# Patient Record
Sex: MALE | Race: WHITE | NOT HISPANIC OR LATINO | Employment: OTHER | URBAN - METROPOLITAN AREA
[De-identification: names, ages, dates, MRNs, and addresses within clinical notes are randomized per-mention and may not be internally consistent; named-entity substitution may affect disease eponyms.]

---

## 2021-08-18 ENCOUNTER — APPOINTMENT (OUTPATIENT)
Dept: BEHAVIORAL HEALTH | Facility: CLINIC | Age: 31
End: 2021-08-18
Payer: COMMERCIAL

## 2021-08-20 ENCOUNTER — OFFICE VISIT (OUTPATIENT)
Dept: BEHAVIORAL HEALTH | Facility: CLINIC | Age: 31
End: 2021-08-20
Payer: COMMERCIAL

## 2021-08-20 DIAGNOSIS — F41.1 GENERALIZED ANXIETY DISORDER: ICD-10-CM

## 2021-08-20 DIAGNOSIS — F90.2 ADHD (ATTENTION DEFICIT HYPERACTIVITY DISORDER), COMBINED TYPE: ICD-10-CM

## 2021-08-20 PROCEDURE — 99204 OFFICE O/P NEW MOD 45 MIN: CPT | Mod: GC | Performed by: STUDENT IN AN ORGANIZED HEALTH CARE EDUCATION/TRAINING PROGRAM

## 2021-08-20 PROCEDURE — 90792 PSYCH DIAG EVAL W/MED SRVCS: CPT | Performed by: PSYCHIATRY & NEUROLOGY

## 2021-08-20 RX ORDER — DEXTROAMPHETAMINE SACCHARATE, AMPHETAMINE ASPARTATE, DEXTROAMPHETAMINE SULFATE AND AMPHETAMINE SULFATE 7.5; 7.5; 7.5; 7.5 MG/1; MG/1; MG/1; MG/1
30 TABLET ORAL 2 TIMES DAILY
Qty: 60 TABLET | Refills: 0 | Status: SHIPPED | OUTPATIENT
Start: 2021-09-19 | End: 2021-10-19

## 2021-08-20 RX ORDER — CLONAZEPAM 1 MG/1
1 TABLET ORAL
Qty: 30 TABLET | Refills: 2 | Status: SHIPPED | OUTPATIENT
Start: 2021-08-20 | End: 2021-09-19

## 2021-08-20 RX ORDER — DEXTROAMPHETAMINE SACCHARATE, AMPHETAMINE ASPARTATE, DEXTROAMPHETAMINE SULFATE AND AMPHETAMINE SULFATE 7.5; 7.5; 7.5; 7.5 MG/1; MG/1; MG/1; MG/1
30 TABLET ORAL 2 TIMES DAILY
Qty: 60 TABLET | Refills: 0 | Status: SHIPPED | OUTPATIENT
Start: 2021-10-19 | End: 2021-11-16 | Stop reason: SDUPTHER

## 2021-08-20 RX ORDER — DEXTROAMPHETAMINE SACCHARATE, AMPHETAMINE ASPARTATE, DEXTROAMPHETAMINE SULFATE AND AMPHETAMINE SULFATE 7.5; 7.5; 7.5; 7.5 MG/1; MG/1; MG/1; MG/1
30 TABLET ORAL 2 TIMES DAILY
Qty: 60 TABLET | Refills: 0 | Status: SHIPPED | OUTPATIENT
Start: 2021-08-20 | End: 2021-09-19

## 2021-08-20 ASSESSMENT — ANXIETY QUESTIONNAIRES
6. BECOMING EASILY ANNOYED OR IRRITABLE: SEVERAL DAYS
3. WORRYING TOO MUCH ABOUT DIFFERENT THINGS: SEVERAL DAYS
IF YOU CHECKED OFF ANY PROBLEMS ON THIS QUESTIONNAIRE, HOW DIFFICULT HAVE THESE PROBLEMS MADE IT FOR YOU TO DO YOUR WORK, TAKE CARE OF THINGS AT HOME, OR GET ALONG WITH OTHER PEOPLE: SOMEWHAT DIFFICULT
4. TROUBLE RELAXING: SEVERAL DAYS
5. BEING SO RESTLESS THAT IT IS HARD TO SIT STILL: NOT AT ALL
2. NOT BEING ABLE TO STOP OR CONTROL WORRYING: NOT AT ALL
1. FEELING NERVOUS, ANXIOUS, OR ON EDGE: MORE THAN HALF THE DAYS
7. FEELING AFRAID AS IF SOMETHING AWFUL MIGHT HAPPEN: MORE THAN HALF THE DAYS
GAD7 TOTAL SCORE: 7

## 2021-08-20 ASSESSMENT — PATIENT HEALTH QUESTIONNAIRE - PHQ9
SUM OF ALL RESPONSES TO PHQ QUESTIONS 1-9: 5
CLINICAL INTERPRETATION OF PHQ2 SCORE: 1
5. POOR APPETITE OR OVEREATING: 0 - NOT AT ALL

## 2021-08-20 NOTE — PROGRESS NOTES
"      INITIAL PSYCHIATRIC EVALUATION      This provider informed the patient their medical records are totally confidential except for the use by other providers involved in their care, or if the patient signs a release, or to report instances of child or elder abuse, or if it is determined they are an immediate risk to harm themselves or others.      CHIEF COMPLAINT  \"I just moved and needed a new psychiatrist\"    HISTORY OF PRESENT ILLNESS  Charlie Booker is a 31 y.o. old male comes in today to establish care and for evaluation of ADHD and anxiety.   Patient states he recently moved with his wife and children from [place] and is seeking to establish care locally. He notes he first started seeing a psychiatrist when he was 17 due to concerns about concentration and attention. He states these concerns were present when he was in school as well as he often had difficulty completing his work and remaining seated. He states he had trials of Vyvanse and Adderall XR without benefit. He notes medication would wear off early and he would be unable to complete tasks for the day. When medication is not present, he notes difficulty with staying on task and completing work. He often will forget and misplace items. He has difficulty remembering appointments and obligations. He will interrupt others when speaking and has difficulty with blurting out. He has most recently been taking Adderall IR 30mg PO BID.   Patient notes periods of increased anxiety with excessive worry. He states he worries overall, but has increased symptoms periodically with increased stressors. 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 He also notes difficulty with wearing masks and states his anxiety has been elevated. He denies feeling on edge at all times or difficulty relaxing. He denies difficulty with sleep or appetite. He states he has " periods with shakiness, difficulty breathing, palpitations. He has been taking Clonazepam 1mg daily as needed for these symptoms. He notes that he does not take medication daily and will try to take half at times. He states he was previously on TID dosing and weaned himself down to this dose. Patient notes previous trials with antidepressant medications and states he will not trial these medications again as he feels this worsened his symptoms.     Adult ADHD Self-Report Scale (ASRS-v1.1) Symptom    1. How often do you have trouble wrapping up the final details of a project, once the challenging parts have been done?   Sometime   2. How often do you have difficulty getting things in order when you have to do a task that requires organization?   Very often   3. How often do you have problems remembering appointments or obligations?   Very often   4. When you have a task that requires a lot of thought, how often do you avoid or delay getting started?   Sometime   5. How often do you fidget or squirm with your hands or feet when you have to sit down for a long time?   Very often   6. How often do you feel overly active and compelled to do things, like you were driven by a motor?   Never   7. How often do you make careless mistakes when you have to work on a boring or difficult project?   Rarely   8. How often do you have difficulty keeping your attention when you are doing boring or repetitive work?   Very often   9. How often do you have difficulty concentrating on what people say to you, even when they are speaking to you directly?   Very often   10. How often do you misplace or have difficulty finding things at home or at work?   Sometime   11. How often are you distracted by activity or noise around you?   Very often   12. How often do you leave your seat in meetings or other situations in which you are expected to remain seated?   Never   13. How often do you feel restless or fidgety?   Very often   14. How often  do you have difficulty unwinding and relaxing when you have time to yourself?   Never   15. How often do you find yourself talking too much when you are in social situations?   Rarely   16. When you're in a conversation, how often do you find yourself finishing the sentences of the people you are talking to, before they can finish them themselves?   Very often   17. How often do you have difficulty waiting your turn in situations when turn taking is required?   Rarely   18. How often do you interrupt others when they are busy?   Sometime       PSYCHIATRIC REVIEW OF SYSTEMS: denies depressive symptoms, denies manic symptoms and denies psychotic symptoms including  / VH      MEDICAL REVIEW OF SYSTEMS:   Constitutional negative   Eyes negative   Ears/Nose/Mouth/Throat negative   Cardiovascular negative   Respiratory negative   Gastrointestinal negative   Genitourinary negative   Muscular negative   Integumentary negative   Neurological negative   Endocrine negative   Hematologic/Lymphatic negative     CURRENT MEDICATIONS:  Current Outpatient Medications   Medication Sig Dispense Refill   • amphetamine-dextroamphetamine (ADDERALL, 30MG,) 30 MG tablet Take 1 Tablet by mouth 2 times a day for 30 days. 60 Tablet 0   • [START ON 9/19/2021] amphetamine-dextroamphetamine (ADDERALL, 30MG,) 30 MG tablet Take 1 Tablet by mouth 2 times a day for 30 days. 60 Tablet 0   • [START ON 10/19/2021] amphetamine-dextroamphetamine (ADDERALL, 30MG,) 30 MG tablet Take 1 Tablet by mouth 2 times a day for 30 days. 60 Tablet 0   • clonazePAM (KLONOPIN) 1 MG Tab Take 1 Tablet by mouth 1 time a day as needed (Anxiety) for up to 30 days. 30 Tablet 2     No current facility-administered medications for this visit.       ALLERGIES:  Patient has no allergy information on record.    PAST PSYCHIATRIC HISTORY  Prior psychiatric hospitalization: denies  Prior Self harm/suicide attempt: denies  Prior Diagnosis: ADHD, Generalized Anxiety Disorder    PAST  PSYCHIATRIC MEDICATIONS  • Adderall XR, Vyvanse  • Sertraline, Effexor, Cymbalta- states he had trials of more but cannot recall. 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 No pertinent past medical history.  History reviewed. No pertinent surgical history.      PHYSICAL EXAMINAION:  Vital signs: There were no vitals taken for this visit.  Musculoskeletal: Normal gait.   Abnormal movements: no abnormal movements noted     MENTAL STATUS EXAMINATION      General:   - Grooming and hygiene: Casual and Neat,   - Apparent distress: no apparent distress ,   - Behavior: Calm  - Eye Contact:  Good,   - no psychomotor agitation or retardation    - Participation: Active verbal participation  Orientation: Alert and Fully Oriented to person, place and time  Mood: Euthymic  Affect: Flexible and Full range,  Thought Process: Logical and Goal-directed  Thought Content: Denies suicidal or homicidal ideations, intent or plan Within normal limits  Perception: Denies auditory or visual hallucinations. No delusions noted Within normal limits  Attention span and concentration: Intact   Speech:Rate within normal limits and Volume within normal  limits  Language: Appropriate   Insight: Good  Judgment: Good  Recent and remote memory: No gross evidence of memory deficits      DEPRESSION SCREENING:  Depression Screen (PHQ-2/PHQ-9) 8/20/2021   PHQ-2 Total Score 1   PHQ-9 Total Score 5       Interpretation of PHQ-9 Total Score   Score Severity   1-4 No Depression   5-9 Mild Depression   10-14 Moderate Depression   15-19 Moderately Severe Depression   20-27 Severe Depression      SAFETY ASSESSMENT - SELF:    Does patient acknowledge current or past symptoms of dangerousness to self? no  History of suicide by family member: no  History of suicide by friend/significant other: no  Recent change in amount/specificity/intensity of suicidal thoughts or self-harm behavior? no         SAFETY ASSESSMENT - OTHERS:    Does patient acknowledge current or past symptoms of aggressive behavior or risk to others? no  Recent change in amount/specificity/intensity of thoughts or threats to harm others? no         CURRENT RISK:       Suicidal: Low       Homicidal: Low       Self-Harm: Low       Relapse: Not applicable       Crisis Safety Plan Reviewed Not Indicated    MEDICAL RECORDS/LABS/DIAGNOSTIC TESTS REVIEWED:  reviewed      [facility] records -   Reviewed, no records       ASSESSMENT  Charlie Booker is a 31 y.o. old male presenting to [facility] care following recent move from [place], [place]. Patient states he has been taking Adderall IR 30mg PO BID and Clonazepam 1mg PO daily with benefit. Discussed dosing of Adderall and requested records from previous physician for continued medication prescribing. Patient does have heightened anxiety that has increased with recent increase in stressors. Reviewed long term risks and concerns with extended benzodiazepine use. Reviewed alternative medications that may be used long term with increased safety. Patient declines to use other medications at this time stating previous trials were not beneficial and detrimental side effects. Reviewed  benefits for therapy with long term plan to decrease and discontinue Clonazepam with patient.       DIFFERENTIAL DIAGNOSES  1. Attention Deficit Hyperactivity Disorder, combined type   2. Generalized Anxiety Disorder      PLAN:  • Start Adderall 30mg PO BID - previous dose  • Start Clonazepam 1mg PO daily as needed for anxiety - previous dose  • Requested records   • Controlled substance agreement reviewed and signed   • I reviewed clinical lab tests done in last 1 year.   • Medication options, alternatives (including no medications) and medication risks/benefits/side effects were discussed in detail.  • The patient was advised to call, message provider on [employer], or come in to the clinic if symptoms worsen or if any future questions/issues regarding their medications arise; the patient verbalized understanding and agreement.    • The patient was educated to call 911, call the suicide hotline, or go to local ER if having thoughts of suicide or homicide; verbalized understanding.        Return to clinic in 3 months  or sooner if symptoms worsen.  Next Appointment:  instruction provided on how to make the next appointment.     The proposed treatment plan was discussed with the patient who was provided the opportunity to ask questions and make suggestions regarding alternative treatment. Patient verbalized understanding and expressed agreement with the plan.     Thank you for allowing me to participate in the care of this patient.    Sherley Bennett D.O.  08/20/21    CC:   No primary care provider on file.

## 2021-08-25 ENCOUNTER — DOCUMENTATION (OUTPATIENT)
Dept: BEHAVIORAL HEALTH | Facility: CLINIC | Age: 31
End: 2021-08-25

## 2021-08-30 ENCOUNTER — DOCUMENTATION (OUTPATIENT)
Dept: BEHAVIORAL HEALTH | Facility: CLINIC | Age: 31
End: 2021-08-30

## 2021-11-16 ENCOUNTER — OFFICE VISIT (OUTPATIENT)
Dept: BEHAVIORAL HEALTH | Facility: CLINIC | Age: 31
End: 2021-11-16
Payer: COMMERCIAL

## 2021-11-16 DIAGNOSIS — F90.2 ADHD (ATTENTION DEFICIT HYPERACTIVITY DISORDER), COMBINED TYPE: ICD-10-CM

## 2021-11-16 DIAGNOSIS — F41.1 GENERALIZED ANXIETY DISORDER: ICD-10-CM

## 2021-11-16 PROCEDURE — 99214 OFFICE O/P EST MOD 30 MIN: CPT | Mod: GC | Performed by: PSYCHIATRY & NEUROLOGY

## 2021-11-16 RX ORDER — CLONAZEPAM 1 MG/1
1 TABLET ORAL
Qty: 30 TABLET | Refills: 0 | Status: SHIPPED | OUTPATIENT
Start: 2021-11-19 | End: 2021-12-14 | Stop reason: SDUPTHER

## 2021-11-16 RX ORDER — IMIPRAMINE HYDROCHLORIDE 10 MG/1
10 TABLET, FILM COATED ORAL 2 TIMES DAILY PRN
Qty: 60 TABLET | Refills: 1 | Status: SHIPPED | OUTPATIENT
Start: 2021-11-16 | End: 2021-12-14 | Stop reason: SDUPTHER

## 2021-11-16 RX ORDER — DEXTROAMPHETAMINE SACCHARATE, AMPHETAMINE ASPARTATE, DEXTROAMPHETAMINE SULFATE AND AMPHETAMINE SULFATE 7.5; 7.5; 7.5; 7.5 MG/1; MG/1; MG/1; MG/1
30 TABLET ORAL 2 TIMES DAILY
Qty: 60 TABLET | Refills: 0 | Status: SHIPPED | OUTPATIENT
Start: 2021-11-19 | End: 2021-11-30 | Stop reason: SDUPTHER

## 2021-11-16 NOTE — PROGRESS NOTES
"[facility]  PSYCHIATRIC FOLLOW-UP NOTE    Persons in attendance: Patient      Reason for visit / chief complaint:   31 year old male presenting for follow up. Patient has history of ADHD and Generalized Anxiety Disorder and was continued on Clonazepam 1mg PO daily and Adderall 30mg PO BID.     \"Things have been good\"         SUBJECTIVE / HPI:  Patient states he has had continued stability with medication regimen. He states that he has been tolerating medication without side effects. He denies dizziness, palpitations. He states he has had dull headaches in the late afternoons at times. Patient denies changes in appetite or sleep. He denies depressed mood. He states he has periods of increased anxiety a majority of days during the week and uses Clonazepam with benefit. He describes these as primarily physical symptoms with heart racing, difficulty breathing, shakiness.         OBJECTIVE:    MSE :   Appearance: well groomed, appropriate    Behavior: calm, cooperative, pleasant, engaged. good eye contact.  No tics. No mannerisms.   Speech : normal rate, normal volume, normal tone   Language: normal vocabulary   Mood: good   Affect: euthymic   Thought Process: linear, coherent, goal-directed. No flight of ideas.  No loose associations   Thought Content: no suicidal or homicidal ideation and no auditory or visual hallucinations    Attention/Concentration: appropriate    Memory: no gross deficits   Orientation: oriented to person, place, situation   Neurological: Deferred   Fund of Knowledge: appropriate   Insight/Judgment: good / good            Not on File     PAST PSYCHIATRIC HISTORY  Prior psychiatric hospitalization: denies  Prior Self harm/suicide attempt: denies  Prior Diagnosis: ADHD, Generalized Anxiety Disorder     PAST PSYCHIATRIC MEDICATIONS  · Adderall XR, Vyvanse  · Sertraline, Effexor, Cymbalta- states he had trials of more but cannot recall. Notes that he had variation of side effects from " medications including decreased libido, metallic taste in mouth, fatigue       FAMILY HISTORY  Psychiatric diagnosis:  Maternal and paternal anxiety; paternal depression   History of suicide attempts:  denies  Substance abuse history:  denies     SUBSTANCE USE HISTORY:  ALCOHOL: on weekends 1-2 drinks with friends   TOBACCO: denies  CANNABIS: denies   OPIOIDS: denies  PRESCRIPTION MEDICATIONS: denies  OTHERS: denies  History of inpatient/outpatient rehab treatment: n/a     SOCIAL HISTORY  Employment: currently working at a [email or web address]   Relationship:  5 years  Kids: two children age 3 and a half and 6 months   Current living situation: lives with wife and children      Review of systems:        Constitutional negative   Eyes negative   Ears/Nose/Mouth/Throat negative   Cardiovascular negative   Respiratory negative   Gastrointestinal negative   Genitourinary negative   Muscular negative   Integumentary negative   Neurological negative   Endocrine negative   Hematologic/Lymphatic negative       Medical Records/Labs/Diagnostic Tests Reviewed:   No new records present       Current Outpatient Medications:   •  [START ON 11/19/2021] amphetamine-dextroamphetamine, 30 mg, Oral, BID  •  [START ON 11/19/2021] clonazePAM, 1 mg, Oral, QDAY PRN  •  imipramine, 10 mg, Oral, BID PRN           ASSESSMENT:  31 year old male with history of adhd and generalized anxiety disorder. Patient has had positive response to Adderall 30mg PO BID and Clonazepam 1mg Po daily as needed. He states he has had trials of multiple medications in the past without benefit. Requested previous records. Discussed long term concerns with use of Clonazepam. Will trial imipramine and monitor for benefit.       DDX:  1. Generalized Anxiety Disorder  2. Attention Deficit Hyperactivity Disorder, unspecified       PLAN:  -Start imipramine 10mg PO daily as needed   - Continue Adderall 30mg PO BID   -Continue Clonazepam 1mg PO daily as needed  -Medication  options, alternatives (including no medications) and medication risks/benefits/side effects were discussed in detail.  -The patient was advised to call, message provider on MyChart, or come in to the clinic if symptoms worsen or if any future questions/issues regarding their medications arise; the patient verbalized understanding and agreement.    -The patient was educated to call 911, call the suicide hotline, or go to local ER if having thoughts of suicide or homicide; verbalized understanding.            - Medical Records/Labs/Diagnostic Tests Ordered: Guerrero Bennett DO

## 2021-11-30 DIAGNOSIS — F90.2 ADHD (ATTENTION DEFICIT HYPERACTIVITY DISORDER), COMBINED TYPE: ICD-10-CM

## 2021-12-02 RX ORDER — DEXTROAMPHETAMINE SACCHARATE, AMPHETAMINE ASPARTATE, DEXTROAMPHETAMINE SULFATE AND AMPHETAMINE SULFATE 7.5; 7.5; 7.5; 7.5 MG/1; MG/1; MG/1; MG/1
30 TABLET ORAL 2 TIMES DAILY
Qty: 60 TABLET | Refills: 0 | Status: SHIPPED | OUTPATIENT
Start: 2021-12-02 | End: 2021-12-14 | Stop reason: SDUPTHER

## 2021-12-14 ENCOUNTER — OFFICE VISIT (OUTPATIENT)
Dept: BEHAVIORAL HEALTH | Facility: CLINIC | Age: 31
End: 2021-12-14
Payer: COMMERCIAL

## 2021-12-14 DIAGNOSIS — F41.1 GENERALIZED ANXIETY DISORDER: ICD-10-CM

## 2021-12-14 DIAGNOSIS — F90.2 ADHD (ATTENTION DEFICIT HYPERACTIVITY DISORDER), COMBINED TYPE: ICD-10-CM

## 2021-12-14 PROCEDURE — 99214 OFFICE O/P EST MOD 30 MIN: CPT | Performed by: PSYCHIATRY & NEUROLOGY

## 2021-12-14 RX ORDER — IMIPRAMINE HYDROCHLORIDE 10 MG/1
10 TABLET, FILM COATED ORAL EVERY EVENING
Qty: 30 TABLET | Refills: 1 | Status: SHIPPED | OUTPATIENT
Start: 2021-12-14 | End: 2022-01-11

## 2021-12-14 RX ORDER — DEXTROAMPHETAMINE SACCHARATE, AMPHETAMINE ASPARTATE, DEXTROAMPHETAMINE SULFATE AND AMPHETAMINE SULFATE 7.5; 7.5; 7.5; 7.5 MG/1; MG/1; MG/1; MG/1
30 TABLET ORAL 2 TIMES DAILY
Qty: 60 TABLET | Refills: 0 | Status: SHIPPED | OUTPATIENT
Start: 2022-01-01 | End: 2022-01-31

## 2021-12-14 RX ORDER — CLONAZEPAM 1 MG/1
1 TABLET ORAL
Qty: 30 TABLET | Refills: 1 | Status: SHIPPED | OUTPATIENT
Start: 2021-12-14 | End: 2022-01-13

## 2021-12-15 NOTE — PROGRESS NOTES
"[facility]  PSYCHIATRIC FOLLOW-UP NOTE    Persons in attendance: Patient      Reason for visit / chief complaint:   31 year old male presenting for follow up. Patient has history of ADHD and Generalized Anxiety Disorder and was continued on Clonazepam 1mg PO daily and Adderall 30mg PO BID. Imipramine 10mg PO BID as needed for anxiety was added as well.     \"Things have been about the same\"        SUBJECTIVE / HPI:  Patient states he has had continued stability with medication regimen. He states trial of Imipramine as needed for lower amounts of anxiety. He states decreased effectiveness in relation to Clonazepam. He states he takes 1mg dose when anxiety is escalated with shakiness and difficulty breathing. When anxiety is at a lower level, he will take 1/2 pill of medication. He notes that he attempted to replace this with Imipramine and states continued distress and discomfort. He states his mood has overall been good. He denies anhedonia. He has been able to complete tasks and states his ability to concentrate has overall been good. He denies side effects with medication at this time.     OBJECTIVE:    MSE :   Appearance: well groomed, appropriate    Behavior: calm, cooperative, pleasant, engaged. good eye contact.  No tics. No mannerisms.   Speech : normal rate, normal volume, normal tone   Language: normal vocabulary   Mood: \"about the same\"   Affect: euthymic   Thought Process: linear, coherent, goal-directed. No flight of ideas.  No loose associations   Thought Content: no suicidal or homicidal ideation and no auditory or visual hallucinations    Attention/Concentration: appropriate    Memory: no gross deficits   Orientation: oriented to person, place, situation   Neurological: Deferred   Fund of Knowledge: appropriate   Insight/Judgment: good / good            Not on File     PAST PSYCHIATRIC HISTORY  Prior psychiatric hospitalization: denies  Prior Self harm/suicide attempt: denies  Prior " Diagnosis: ADHD, Generalized Anxiety Disorder     PAST PSYCHIATRIC MEDICATIONS  · Adderall XR, Vyvanse  · Sertraline, Effexor, Cymbalta- states he had trials of more but cannot recall. Notes that he had variation of side effects from medications including decreased libido, metallic taste in mouth, fatigue       FAMILY HISTORY  Psychiatric diagnosis:  Maternal and paternal anxiety; paternal depression   History of suicide attempts:  denies  Substance abuse history:  denies     SUBSTANCE USE HISTORY:  ALCOHOL: on weekends 1-2 drinks with friends   TOBACCO: denies  CANNABIS: denies   OPIOIDS: denies  PRESCRIPTION MEDICATIONS: denies  OTHERS: denies  History of inpatient/outpatient rehab treatment: n/a     SOCIAL HISTORY  Employment: currently working at a [employer]   Relationship:  5 years  Kids: two children age 3 and a half and 6 months   Current living situation: lives with wife and children      Review of systems:        Constitutional negative   Eyes negative   Ears/Nose/Mouth/Throat negative   Cardiovascular negative   Respiratory negative   Gastrointestinal negative   Genitourinary negative   Muscular negative   Integumentary negative   Neurological negative   Endocrine negative   Hematologic/Lymphatic negative       Medical Records/Labs/Diagnostic Tests Reviewed:   No new records present       Current Outpatient Medications:   •  [START ON 1/1/2022] amphetamine-dextroamphetamine, 30 mg, Oral, BID  •  clonazePAM, 1 mg, Oral, QDAY PRN  •  imipramine, 10 mg, Oral, Q EVENING           ASSESSMENT:  31 year old male with history of adhd and generalized anxiety disorder. Patient has had positive response to Adderall 30mg PO BID and Clonazepam 1mg Po daily as needed. Patient has had some response to Imipramine 10mg PO BID as needed for anxiety. Discussed scheduling bedtime dose of medication to aid with baseline anxiety. Patient states he will do trial of this to see if anxiety is overall improved.       DDX:  1.  Generalized Anxiety Disorder  2. Attention Deficit Hyperactivity Disorder, unspecified       PLAN:  - Change Imipramine 10mg PO at bedtime   - Continue Adderall 30mg PO BID   -Continue Clonazepam 1mg PO daily as needed  -Medication options, alternatives (including no medications) and medication risks/benefits/side effects were discussed in detail.  -The patient was advised to call, message provider on [employer], or come in to the clinic if symptoms worsen or if any future questions/issues regarding their medications arise; the patient verbalized understanding and agreement.    -The patient was educated to call 911, call the suicide hotline, or go to local ER if having thoughts of suicide or homicide; verbalized understanding.            - Medical Records/Labs/Diagnostic Tests Ordered: None      Sherley Bennett DO

## 2022-01-11 DIAGNOSIS — F41.1 GENERALIZED ANXIETY DISORDER: ICD-10-CM

## 2022-01-11 RX ORDER — IMIPRAMINE HYDROCHLORIDE 10 MG/1
TABLET, FILM COATED ORAL
Qty: 30 TABLET | Refills: 1 | Status: SHIPPED | OUTPATIENT
Start: 2022-01-11 | End: 2022-03-22

## 2022-01-18 ENCOUNTER — APPOINTMENT (OUTPATIENT)
Dept: BEHAVIORAL HEALTH | Facility: CLINIC | Age: 32
End: 2022-01-18
Payer: COMMERCIAL

## 2022-02-08 ENCOUNTER — OFFICE VISIT (OUTPATIENT)
Dept: BEHAVIORAL HEALTH | Facility: CLINIC | Age: 32
End: 2022-02-08
Payer: COMMERCIAL

## 2022-02-08 DIAGNOSIS — F41.1 GENERALIZED ANXIETY DISORDER: ICD-10-CM

## 2022-02-08 DIAGNOSIS — F90.2 ADHD (ATTENTION DEFICIT HYPERACTIVITY DISORDER), COMBINED TYPE: ICD-10-CM

## 2022-02-08 PROCEDURE — 99214 OFFICE O/P EST MOD 30 MIN: CPT | Mod: GC | Performed by: PSYCHIATRY & NEUROLOGY

## 2022-02-08 RX ORDER — DEXTROAMPHETAMINE SACCHARATE, AMPHETAMINE ASPARTATE, DEXTROAMPHETAMINE SULFATE AND AMPHETAMINE SULFATE 7.5; 7.5; 7.5; 7.5 MG/1; MG/1; MG/1; MG/1
30 TABLET ORAL 2 TIMES DAILY
Qty: 60 TABLET | Refills: 0 | Status: SHIPPED | OUTPATIENT
Start: 2022-03-06 | End: 2022-03-29 | Stop reason: SDUPTHER

## 2022-02-08 RX ORDER — CLONAZEPAM 1 MG/1
1 TABLET ORAL DAILY
Qty: 30 TABLET | Refills: 1 | Status: SHIPPED | OUTPATIENT
Start: 2022-02-08 | End: 2022-03-10

## 2022-02-08 ASSESSMENT — PATIENT HEALTH QUESTIONNAIRE - PHQ9
5. POOR APPETITE OR OVEREATING: 0 - NOT AT ALL
CLINICAL INTERPRETATION OF PHQ2 SCORE: 0

## 2022-02-09 NOTE — PROGRESS NOTES
"[facility]  PSYCHIATRIC FOLLOW-UP NOTE    Persons in attendance: Patient      Reason for visit / chief complaint:   31 year old male presenting for follow up. Patient has history of ADHD and Generalized Anxiety Disorder and was continued on Clonazepam 1mg PO daily and Adderall 30mg PO BID. Imipramine 10mg PO qhs.    \"No real changes\"        SUBJECTIVE / HPI:  With regards to anxiety, patient states continued periods of increased anxiety with shakiness, difficulty breathing, palpitations. He takes Clonazepam approximately 3 times a week.  He has been taking imipramine nightly and states he has not seen a benefit, but has not had side effects as well. He has overall tolerated medication well. He denies depressed mood, anhedonia, changes in energy, changes in appetite. He denies suicidal ideation, plan or intent.   With regards to ADHD, patient states continued ability to focus and stay on task. He states he only usually needs medication during the first part of the day and feels that it wears off by late afternoon without concern. He does not feel medication is interfering with sleep. He denies palpitations.     OBJECTIVE:    MSE :   Appearance: well groomed, appropriate    Behavior: calm, cooperative, pleasant, engaged. good eye contact.  No tics. No mannerisms.   Speech : normal rate, normal volume, normal tone   Language: normal vocabulary   Mood: \"good\"   Affect: euthymic   Thought Process: linear, coherent, goal-directed. No flight of ideas.  No loose associations   Thought Content: no suicidal or homicidal ideation and no auditory or visual hallucinations    Attention/Concentration: appropriate    Memory: no gross deficits   Orientation: oriented to person, place, situation   Neurological: Deferred   Fund of Knowledge: appropriate   Insight/Judgment: good / good            Not on File     PAST PSYCHIATRIC HISTORY  Prior psychiatric hospitalization: denies  Prior Self harm/suicide attempt: " denies  Prior Diagnosis: ADHD, Generalized Anxiety Disorder     PAST PSYCHIATRIC MEDICATIONS  · Adderall XR, Vyvanse  · Sertraline, Effexor, Cymbalta- states he had trials of more but cannot recall. Notes that he had variation of side effects from medications including decreased libido, metallic taste in mouth, fatigue       FAMILY HISTORY  Psychiatric diagnosis:  Maternal and paternal anxiety; paternal depression   History of suicide attempts:  denies  Substance abuse history:  denies     SUBSTANCE USE HISTORY:  ALCOHOL: on weekends 1-2 drinks with friends   TOBACCO: denies  CANNABIS: denies   OPIOIDS: denies  PRESCRIPTION MEDICATIONS: denies  OTHERS: denies  History of inpatient/outpatient rehab treatment: n/a     SOCIAL HISTORY  Employment: currently working at a [employer]   Relationship:  5 years  Kids: two children age 3 and a half and 6 months   Current living situation: lives with wife and children      Review of systems:        Constitutional negative   Eyes negative   Ears/Nose/Mouth/Throat negative   Cardiovascular negative   Respiratory negative   Gastrointestinal negative   Genitourinary negative   Muscular negative   Integumentary negative   Neurological negative   Endocrine negative   Hematologic/Lymphatic negative       Medical Records/Labs/Diagnostic Tests Reviewed:   No new records present       Current Outpatient Medications:   •  clonazePAM, 1 mg, Oral, DAILY  •  [START ON 3/6/2022] amphetamine-dextroamphetamine, 30 mg, Oral, BID  •  imipramine, TAKE 1 TABLET BY MOUTH EVERY DAY IN THE EVENING           ASSESSMENT:  31 year old male with history of adhd and generalized anxiety disorder. Patient has had positive response to Adderall 30mg PO BID and Clonazepam 1mg Po daily as needed. Discussed concern about continued long term use of Clonazepam and ultimate goal of decrease in baseline anxiety. As patient has tolerated Imipramine, discussed increasing dose to aid with anxiety and continued  increase as tolerated to treatment dose.        DDX:  1. Generalized Anxiety Disorder  2. Attention Deficit Hyperactivity Disorder, unspecified       PLAN:  - Increase Imipramine 20mg PO at bedtime   - Continue Adderall 30mg PO BID   -Continue Clonazepam 1mg PO daily as needed  -Medication options, alternatives (including no medications) and medication risks/benefits/side effects were discussed in detail.  -The patient was advised to call, message provider on [employer], or come in to the clinic if symptoms worsen or if any future questions/issues regarding their medications arise; the patient verbalized understanding and agreement.    -The patient was educated to call 911, call the suicide hotline, or go to local ER if having thoughts of suicide or homicide; verbalized understanding.            - Medical Records/Labs/Diagnostic Tests Ordered: Guerrero Bennett DO

## 2022-03-22 DIAGNOSIS — F41.1 GENERALIZED ANXIETY DISORDER: ICD-10-CM

## 2022-03-22 RX ORDER — IMIPRAMINE HYDROCHLORIDE 10 MG/1
TABLET, FILM COATED ORAL
Qty: 30 TABLET | Refills: 1 | Status: SHIPPED | OUTPATIENT
Start: 2022-03-22 | End: 2022-03-29 | Stop reason: SDUPTHER

## 2022-04-25 ENCOUNTER — TELEMEDICINE (OUTPATIENT)
Dept: BEHAVIORAL HEALTH | Facility: CLINIC | Age: 32
End: 2022-04-25
Payer: COMMERCIAL

## 2022-04-25 DIAGNOSIS — F90.2 ADHD (ATTENTION DEFICIT HYPERACTIVITY DISORDER), COMBINED TYPE: ICD-10-CM

## 2022-04-25 DIAGNOSIS — F41.1 GENERALIZED ANXIETY DISORDER: ICD-10-CM

## 2022-04-25 PROCEDURE — 99214 OFFICE O/P EST MOD 30 MIN: CPT | Mod: 95,GC | Performed by: STUDENT IN AN ORGANIZED HEALTH CARE EDUCATION/TRAINING PROGRAM

## 2022-04-25 RX ORDER — CLONAZEPAM 1 MG/1
1 TABLET ORAL
Qty: 30 TABLET | Refills: 1 | Status: SHIPPED | OUTPATIENT
Start: 2022-05-05 | End: 2022-06-04

## 2022-04-25 RX ORDER — DEXTROAMPHETAMINE SACCHARATE, AMPHETAMINE ASPARTATE, DEXTROAMPHETAMINE SULFATE AND AMPHETAMINE SULFATE 7.5; 7.5; 7.5; 7.5 MG/1; MG/1; MG/1; MG/1
30 TABLET ORAL 2 TIMES DAILY
Qty: 60 TABLET | Refills: 0 | Status: SHIPPED | OUTPATIENT
Start: 2022-05-05 | End: 2022-06-04

## 2022-04-25 RX ORDER — DEXTROAMPHETAMINE SACCHARATE, AMPHETAMINE ASPARTATE, DEXTROAMPHETAMINE SULFATE AND AMPHETAMINE SULFATE 7.5; 7.5; 7.5; 7.5 MG/1; MG/1; MG/1; MG/1
30 TABLET ORAL 2 TIMES DAILY
Qty: 60 TABLET | Refills: 0 | Status: SHIPPED | OUTPATIENT
Start: 2022-06-04 | End: 2022-06-28 | Stop reason: SDUPTHER

## 2022-04-25 NOTE — PROGRESS NOTES
"This evaluation was conducted via Zoom using secure and encrypted videoconferencing technology. The patient was in a location outside of their home in the [facility].    The patient's identity was confirmed and verbal consent was obtained for this virtual visit.    [facility]  PSYCHIATRIC FOLLOW-UP NOTE    Persons in attendance: Patient      Reason for visit / chief complaint:   31 year old male presenting for follow up. Patient has history of ADHD and Generalized Anxiety Disorder and was continued on Clonazepam 1mg PO daily and Adderall 30mg PO BID. Imipramine was increased to 20mg PO qhs.    \"I'm okay\"    SUBJECTIVE / HPI:  With regards to anxiety, patient states he has continued to use Clonazepam 3-4 times a week due to increased anxiety. He notes that imipramine increased dose cause dizziness and patient discontinued medication approximately a month ago. He has not noticed an increase in anxiety since discontinuation of Imipramine and states he does not feel his Clonazepam use varied during this time. He denies dizziness or falls with use of Clonazepam. He states his mood has overall been good and he has been engaging in activities with his family.   With regards to concentration, he states continued improvement of focus and task completion with Adderall. He denies changes in sleep or appetite suppression. Patient states he does not notice worsening anxiety on days when he takes both doses of medication and states he has not noticed a correlation between medication use and increase in anxiety.     OBJECTIVE:    MSE :   Appearance: well groomed, appropriate    Behavior: calm, cooperative, pleasant, engaged. good eye contact.  No tics. No mannerisms.   Speech : normal rate, normal volume, normal tone   Language: normal vocabulary   Mood: \"about the same\"   Affect: euthymic   Thought Process: linear, coherent, goal-directed. No flight of ideas.  No loose associations   Thought Content: no " suicidal or homicidal ideation and no auditory or visual hallucinations    Attention/Concentration: appropriate    Memory: no gross deficits   Orientation: oriented to person, place, situation   Neurological: Deferred   Fund of Knowledge: appropriate   Insight/Judgment: good / good            Not on File     PAST PSYCHIATRIC HISTORY  Prior psychiatric hospitalization: denies  Prior Self harm/suicide attempt: denies  Prior Diagnosis: ADHD, Generalized Anxiety Disorder     PAST PSYCHIATRIC MEDICATIONS  · Adderall XR, Vyvanse  · Sertraline, Effexor, Cymbalta- states he had trials of more but cannot recall. Notes that he had variation of side effects from medications including decreased libido, metallic taste in mouth, fatigue       FAMILY HISTORY  Psychiatric diagnosis:  Maternal and paternal anxiety; paternal depression   History of suicide attempts:  denies  Substance abuse history:  denies     SUBSTANCE USE HISTORY:  ALCOHOL: on weekends 1-2 drinks with friends   TOBACCO: denies  CANNABIS: denies   OPIOIDS: denies  PRESCRIPTION MEDICATIONS: denies  OTHERS: denies  History of inpatient/outpatient rehab treatment: n/a     SOCIAL HISTORY  Employment: currently working at a [employer]   Relationship:  5 years  Kids: two children age 3 and a half and 6 months   Current living situation: lives with wife and children      Review of systems:        Constitutional negative   Eyes negative   Ears/Nose/Mouth/Throat negative   Cardiovascular negative   Respiratory negative   Gastrointestinal negative   Genitourinary negative   Muscular negative   Integumentary negative   Neurological negative   Endocrine negative   Hematologic/Lymphatic negative       Medical Records/Labs/Diagnostic Tests Reviewed:   No new records present       Current Outpatient Medications:   •  [START ON 6/4/2022] amphetamine-dextroamphetamine, 30 mg, Oral, BID  •  [START ON 5/5/2022] clonazePAM, 1 mg, Oral, QDAY PRN  •  [START ON 5/5/2022]  amphetamine-dextroamphetamine, 30 mg, Oral, BID           ASSESSMENT:  31 year old male with history of adhd and generalized anxiety disorder. Patient has had positive response to Adderall 30mg PO BID and Clonazepam 1mg Po daily as needed. Patient was unable to tolerate dose increase in Imipramine and voices distress with use of SNRI and SSRI medication due to previous side effects. Will continue current regimen and re-evaluate alternative options to aid with anxiety at follow up.    DDX:  1. Generalized Anxiety Disorder  2. Attention Deficit Hyperactivity Disorder, unspecified       PLAN:  - Discontinue Imipramine   - Continue Adderall 30mg PO BID   -Continue Clonazepam 1mg PO daily as needed  -Medication options, alternatives (including no medications) and medication risks/benefits/side effects were discussed in detail.  -The patient was advised to call, message provider on [employer], or come in to the clinic if symptoms worsen or if any future questions/issues regarding their medications arise; the patient verbalized understanding and agreement.    -The patient was educated to call 911, call the suicide hotline, or go to local ER if having thoughts of suicide or homicide; verbalized understanding.            - Medical Records/Labs/Diagnostic Tests Ordered: None      Sherley Bennett DO

## 2022-06-06 ENCOUNTER — APPOINTMENT (OUTPATIENT)
Dept: BEHAVIORAL HEALTH | Facility: CLINIC | Age: 32
End: 2022-06-06
Payer: COMMERCIAL

## 2022-06-06 DIAGNOSIS — F90.2 ADHD (ATTENTION DEFICIT HYPERACTIVITY DISORDER), COMBINED TYPE: ICD-10-CM

## 2022-06-06 DIAGNOSIS — F41.1 GENERALIZED ANXIETY DISORDER: ICD-10-CM

## 2022-06-06 RX ORDER — CLONAZEPAM 1 MG/1
1 TABLET ORAL
Qty: 30 TABLET | Refills: 0 | OUTPATIENT
Start: 2022-06-06 | End: 2022-07-06

## 2022-06-06 RX ORDER — DEXTROAMPHETAMINE SACCHARATE, AMPHETAMINE ASPARTATE, DEXTROAMPHETAMINE SULFATE AND AMPHETAMINE SULFATE 7.5; 7.5; 7.5; 7.5 MG/1; MG/1; MG/1; MG/1
30 TABLET ORAL 2 TIMES DAILY
Qty: 60 TABLET | Refills: 0 | OUTPATIENT
Start: 2022-06-06 | End: 2022-07-06

## 2022-06-06 NOTE — TELEPHONE ENCOUNTER
pt that is out of medication for today ..thank you    Received request via: Patient    Was the patient seen in the last year in this department? Yes    Does the patient have an active prescription (recently filled or refills available) for medication(s) requested? No

## 2022-06-28 ENCOUNTER — TELEMEDICINE (OUTPATIENT)
Dept: BEHAVIORAL HEALTH | Facility: CLINIC | Age: 32
End: 2022-06-28
Payer: COMMERCIAL

## 2022-06-28 DIAGNOSIS — F41.1 GENERALIZED ANXIETY DISORDER: ICD-10-CM

## 2022-06-28 DIAGNOSIS — F90.2 ADHD (ATTENTION DEFICIT HYPERACTIVITY DISORDER), COMBINED TYPE: ICD-10-CM

## 2022-06-28 PROCEDURE — 99214 OFFICE O/P EST MOD 30 MIN: CPT | Mod: 95,GC | Performed by: PSYCHIATRY & NEUROLOGY

## 2022-06-28 RX ORDER — DEXTROAMPHETAMINE SACCHARATE, AMPHETAMINE ASPARTATE, DEXTROAMPHETAMINE SULFATE AND AMPHETAMINE SULFATE 7.5; 7.5; 7.5; 7.5 MG/1; MG/1; MG/1; MG/1
30 TABLET ORAL 2 TIMES DAILY
Qty: 60 TABLET | Refills: 0 | Status: SHIPPED | OUTPATIENT
Start: 2022-07-06 | End: 2022-08-05

## 2022-06-28 RX ORDER — DEXTROAMPHETAMINE SACCHARATE, AMPHETAMINE ASPARTATE, DEXTROAMPHETAMINE SULFATE AND AMPHETAMINE SULFATE 7.5; 7.5; 7.5; 7.5 MG/1; MG/1; MG/1; MG/1
30 TABLET ORAL 2 TIMES DAILY
Qty: 60 TABLET | Refills: 0 | Status: SHIPPED | OUTPATIENT
Start: 2022-08-05 | End: 2022-09-04

## 2022-06-28 RX ORDER — CLONAZEPAM 1 MG/1
TABLET ORAL
COMMUNITY
Start: 2022-06-06 | End: 2022-06-28 | Stop reason: SDUPTHER

## 2022-06-28 RX ORDER — DEXTROAMPHETAMINE SACCHARATE, AMPHETAMINE ASPARTATE, DEXTROAMPHETAMINE SULFATE AND AMPHETAMINE SULFATE 7.5; 7.5; 7.5; 7.5 MG/1; MG/1; MG/1; MG/1
30 TABLET ORAL 2 TIMES DAILY
Qty: 60 TABLET | Refills: 0 | Status: SHIPPED | OUTPATIENT
Start: 2022-09-06 | End: 2022-10-04 | Stop reason: SDUPTHER

## 2022-06-28 RX ORDER — CLONAZEPAM 1 MG/1
1 TABLET ORAL
Qty: 30 TABLET | Refills: 2 | Status: SHIPPED | OUTPATIENT
Start: 2022-06-28 | End: 2022-10-04 | Stop reason: SDUPTHER

## 2022-06-28 NOTE — PROGRESS NOTES
"This evaluation was conducted via Zoom using secure and encrypted videoconferencing technology. The patient was in a location outside of their home in the [facility].    The patient's identity was confirmed and verbal consent was obtained for this virtual visit.    [facility]  PSYCHIATRIC FOLLOW-UP NOTE    Persons in attendance: Patient      Reason for visit / chief complaint:   31 year old male presenting for follow up. Patient has history of ADHD and Generalized Anxiety Disorder and was continued on Clonazepam 1mg PO daily and Adderall 30mg PO BID. Imipramine was discontinued due to intolerance.    \"Things are the same\"    SUBJECTIVE / HPI:  With regards to anxiety,patient states continued periods of increased anxiety. He cannot identify stressors that contribute to periods of increased anxiety. He states he does not take Clonazepaam daily and often will take medication 3-4 times per week. He will try to take 1/2 tablet and will take an additional half if he does not have improvement of symptoms. He states symptoms of palpitations, shakiness, and dizziness.  With regards to ADHD, patient states he has continued to have ability to focus and complete tasks with use of medication. He has a job interview today and states he has been preparing to find an additional job. He states he may be moving in the near future.    OBJECTIVE:    MSE :   Appearance: well groomed, appropriate    Behavior: calm, cooperative, pleasant, engaged. good eye contact.  No tics. No mannerisms.   Speech : normal rate, normal volume, normal tone   Language: normal vocabulary   Mood: \"the same\"   Affect: euthymic   Thought Process: linear, coherent, goal-directed. No flight of ideas.  No loose associations   Thought Content: no suicidal or homicidal ideation and no auditory or visual hallucinations    Attention/Concentration: appropriate    Memory: no gross deficits   Orientation: oriented to person, place, " situation   Neurological: Deferred   Fund of Knowledge: appropriate   Insight/Judgment: good / good            Not on File     PAST PSYCHIATRIC HISTORY  Prior psychiatric hospitalization: denies  Prior Self harm/suicide attempt: denies  Prior Diagnosis: ADHD, Generalized Anxiety Disorder     PAST PSYCHIATRIC MEDICATIONS  · Adderall XR, Vyvanse  · Sertraline, Effexor, Cymbalta- states he had trials of more but cannot recall. Notes that he had variation of side effects from medications including decreased libido, metallic taste in mouth, fatigue       FAMILY HISTORY  Psychiatric diagnosis:  Maternal and paternal anxiety; paternal depression   History of suicide attempts:  denies  Substance abuse history:  denies     SUBSTANCE USE HISTORY:  ALCOHOL: on weekends 1-2 drinks with friends   TOBACCO: denies  CANNABIS: denies   OPIOIDS: denies  PRESCRIPTION MEDICATIONS: denies  OTHERS: denies  History of inpatient/outpatient rehab treatment: n/a     SOCIAL HISTORY  Employment: currently working at a [employer]   Relationship:  5 years  Kids: two children age 3 and a half and 6 months   Current living situation: lives with wife and children      Review of systems:        Constitutional negative   Eyes negative   Ears/Nose/Mouth/Throat negative   Cardiovascular negative   Respiratory negative   Gastrointestinal negative   Genitourinary negative   Muscular negative   Integumentary negative   Neurological negative   Endocrine negative   Hematologic/Lymphatic negative       Medical Records/Labs/Diagnostic Tests Reviewed:   No new records present       Current Outpatient Medications:   •  [START ON 7/6/2022] amphetamine-dextroamphetamine, 30 mg, Oral, BID  •  clonazePAM, 1 mg, Oral, QDAY PRN  •  [START ON 8/5/2022] amphetamine-dextroamphetamine, 30 mg, Oral, BID  •  [START ON 9/6/2022] amphetamine-dextroamphetamine, 30 mg, Oral, BID           ASSESSMENT:  31 year old male with history of adhd and generalized anxiety disorder.  Patient states he has had continued benefit with current use of medication. He denies side effects and notes continued concern with use of SSRI and SNRI medications.     DDX:  1. Generalized Anxiety Disorder  2. Attention Deficit Hyperactivity Disorder, unspecified       PLAN:  - Continue Adderall 30mg PO BID   -Continue Clonazepam 1mg PO daily as needed  -Medication options, alternatives (including no medications) and medication risks/benefits/side effects were discussed in detail.  -The patient was advised to call, message provider on [employer], or come in to the clinic if symptoms worsen or if any future questions/issues regarding their medications arise; the patient verbalized understanding and agreement.    -The patient was educated to call 911, call the suicide hotline, or go to local ER if having thoughts of suicide or homicide; verbalized understanding.            - Medical Records/Labs/Diagnostic Tests Ordered: None      Sherley Bennett DO

## 2022-07-07 ENCOUNTER — APPOINTMENT (RX ONLY)
Dept: URBAN - METROPOLITAN AREA CLINIC 22 | Facility: CLINIC | Age: 32
Setting detail: DERMATOLOGY
End: 2022-07-07

## 2022-07-07 DIAGNOSIS — L71.8 OTHER ROSACEA: ICD-10-CM | Status: INADEQUATELY CONTROLLED

## 2022-07-07 DIAGNOSIS — D22 MELANOCYTIC NEVI: ICD-10-CM

## 2022-07-07 DIAGNOSIS — D18.0 HEMANGIOMA: ICD-10-CM

## 2022-07-07 DIAGNOSIS — Z71.89 OTHER SPECIFIED COUNSELING: ICD-10-CM

## 2022-07-07 DIAGNOSIS — B07.0 PLANTAR WART: ICD-10-CM

## 2022-07-07 DIAGNOSIS — L81.4 OTHER MELANIN HYPERPIGMENTATION: ICD-10-CM

## 2022-07-07 PROBLEM — D22.62 MELANOCYTIC NEVI OF LEFT UPPER LIMB, INCLUDING SHOULDER: Status: ACTIVE | Noted: 2022-07-07

## 2022-07-07 PROBLEM — D22.61 MELANOCYTIC NEVI OF RIGHT UPPER LIMB, INCLUDING SHOULDER: Status: ACTIVE | Noted: 2022-07-07

## 2022-07-07 PROBLEM — D18.01 HEMANGIOMA OF SKIN AND SUBCUTANEOUS TISSUE: Status: ACTIVE | Noted: 2022-07-07

## 2022-07-07 PROBLEM — D48.5 NEOPLASM OF UNCERTAIN BEHAVIOR OF SKIN: Status: ACTIVE | Noted: 2022-07-07

## 2022-07-07 PROBLEM — D22.5 MELANOCYTIC NEVI OF TRUNK: Status: ACTIVE | Noted: 2022-07-07

## 2022-07-07 PROCEDURE — ? LIQUID NITROGEN

## 2022-07-07 PROCEDURE — 17110 DESTRUCTION B9 LES UP TO 14: CPT

## 2022-07-07 PROCEDURE — 11102 TANGNTL BX SKIN SINGLE LES: CPT | Mod: 59

## 2022-07-07 PROCEDURE — ? PRESCRIPTION

## 2022-07-07 PROCEDURE — ? SUNSCREEN RECOMMENDATIONS

## 2022-07-07 PROCEDURE — ? COUNSELING

## 2022-07-07 PROCEDURE — 99204 OFFICE O/P NEW MOD 45 MIN: CPT | Mod: 25

## 2022-07-07 PROCEDURE — ? BIOPSY BY SHAVE METHOD

## 2022-07-07 RX ORDER — METRONIDAZOLE 7.5 MG/G
CREAM TOPICAL BID
Qty: 45 | Refills: 5 | Status: ERX | COMMUNITY
Start: 2022-07-07

## 2022-07-07 RX ADMIN — METRONIDAZOLE: 7.5 CREAM TOPICAL at 00:00

## 2022-07-07 ASSESSMENT — LOCATION SIMPLE DESCRIPTION DERM
LOCATION SIMPLE: RIGHT THIGH
LOCATION SIMPLE: RIGHT FOREARM
LOCATION SIMPLE: LEFT FOREARM
LOCATION SIMPLE: ABDOMEN
LOCATION SIMPLE: RIGHT LOWER BACK
LOCATION SIMPLE: RIGHT UPPER BACK
LOCATION SIMPLE: LEFT CHEEK
LOCATION SIMPLE: RIGHT CHEEK
LOCATION SIMPLE: RIGHT PLANTAR SURFACE

## 2022-07-07 ASSESSMENT — LOCATION DETAILED DESCRIPTION DERM
LOCATION DETAILED: RIGHT PROXIMAL DORSAL FOREARM
LOCATION DETAILED: EPIGASTRIC SKIN
LOCATION DETAILED: RIGHT LATERAL PLANTAR MIDFOOT
LOCATION DETAILED: RIGHT ANTERIOR DISTAL THIGH
LOCATION DETAILED: RIGHT CENTRAL MALAR CHEEK
LOCATION DETAILED: RIGHT SUPERIOR UPPER BACK
LOCATION DETAILED: LEFT DISTAL DORSAL FOREARM
LOCATION DETAILED: LEFT MEDIAL MALAR CHEEK
LOCATION DETAILED: RIGHT SUPERIOR MEDIAL MIDBACK
LOCATION DETAILED: LEFT PROXIMAL DORSAL FOREARM

## 2022-07-07 ASSESSMENT — LOCATION ZONE DERM
LOCATION ZONE: TRUNK
LOCATION ZONE: FACE
LOCATION ZONE: LEG
LOCATION ZONE: ARM
LOCATION ZONE: FEET

## 2022-07-07 NOTE — PROCEDURE: LIQUID NITROGEN
Spray Paint Text: The liquid nitrogen was applied to the skin utilizing a spray paint frosting technique.
Consent: The patient's consent was obtained including but not limited to risks of crusting, scabbing, blistering, scarring, darker or lighter pigmentary change, recurrence, incomplete removal and infection.
Show Topical Anesthesia Variable?: Yes
Application Tool (Optional): Liquid Nitrogen Sprayer
Pared With?: curette
Number Of Freeze-Thaw Cycles: 3 freeze-thaw cycles
Medical Necessity Information: It is in your best interest to select a reason for this procedure from the list below. All of these items fulfill various CMS LCD requirements except the new and changing color options.
Render Note In Bullet Format When Appropriate: No
Duration Of Freeze Thaw-Cycle (Seconds): 3
Medical Necessity Clause: This procedure was medically necessary because the lesions that were treated were:
Detail Level: Detailed
Post-Care Instructions: I reviewed with the patient in detail post-care instructions. Patient is to wear sunprotection, and avoid picking at any of the treated lesions. Pt may apply Vaseline to crusted or scabbing areas.

## 2022-07-07 NOTE — PROCEDURE: MIPS QUALITY
Detail Level: Detailed
Quality 130: Documentation Of Current Medications In The Medical Record: Current Medications Documented
Quality 111:Pneumonia Vaccination Status For Older Adults: Documentation of medical reason(s) for not administering pneumococcal vaccine (e.g., adverse reaction to vaccine)
Quality 226: Preventive Care And Screening: Tobacco Use: Screening And Cessation Intervention: Patient screened for tobacco use and is an ex/non-smoker
Quality 110: Preventive Care And Screening: Influenza Immunization: Influenza Immunization not Administered for Documented Reasons.

## 2022-07-28 ENCOUNTER — APPOINTMENT (RX ONLY)
Dept: URBAN - METROPOLITAN AREA CLINIC 22 | Facility: CLINIC | Age: 32
Setting detail: DERMATOLOGY
End: 2022-07-28

## 2022-07-28 DIAGNOSIS — B07.0 PLANTAR WART: ICD-10-CM

## 2022-07-28 DIAGNOSIS — L56.8 OTHER SPECIFIED ACUTE SKIN CHANGES DUE TO ULTRAVIOLET RADIATION: ICD-10-CM

## 2022-07-28 DIAGNOSIS — Z71.89 OTHER SPECIFIED COUNSELING: ICD-10-CM

## 2022-07-28 PROCEDURE — 11056 PARNG/CUTG B9 HYPRKR LES 2-4: CPT | Mod: 59

## 2022-07-28 PROCEDURE — 17110 DESTRUCTION B9 LES UP TO 14: CPT

## 2022-07-28 PROCEDURE — 99212 OFFICE O/P EST SF 10 MIN: CPT | Mod: 25

## 2022-07-28 PROCEDURE — ? LIQUID NITROGEN

## 2022-07-28 PROCEDURE — ? PARING HYPERKERATOTIC LESION

## 2022-07-28 PROCEDURE — ? SUNSCREEN RECOMMENDATIONS

## 2022-07-28 PROCEDURE — ? COUNSELING

## 2022-07-28 ASSESSMENT — LOCATION ZONE DERM: LOCATION ZONE: FEET

## 2022-07-28 ASSESSMENT — LOCATION SIMPLE DESCRIPTION DERM: LOCATION SIMPLE: RIGHT PLANTAR SURFACE

## 2022-07-28 ASSESSMENT — LOCATION DETAILED DESCRIPTION DERM: LOCATION DETAILED: RIGHT LATERAL PLANTAR MIDFOOT

## 2022-07-28 NOTE — PROCEDURE: PARING HYPERKERATOTIC LESION
Medical Necessity Information: LCD Guidelines vary from payer to payer. Please check with your payer's policy to determine medical necessity. Many payers require at least 1 Class A indication, 2 Class B indications or 1 Class B and 2 Class C to qualify for insurance payment.
Paring Method: curette
Medical Necessity Clause: This procedure was medically necessary because the patient has pain with walking.

## 2022-07-28 NOTE — HPI: WARTS (VERRUCA)
How Severe Are Your Warts?: mild
Is This A New Presentation, Or A Follow-Up?: Follow Up Devorah
Treatment Number (Optional): 1

## 2022-08-23 ENCOUNTER — APPOINTMENT (RX ONLY)
Dept: URBAN - METROPOLITAN AREA CLINIC 6 | Facility: CLINIC | Age: 32
Setting detail: DERMATOLOGY
End: 2022-08-23

## 2022-08-23 DIAGNOSIS — B07.0 PLANTAR WART: ICD-10-CM | Status: IMPROVED

## 2022-08-23 PROCEDURE — 17110 DESTRUCTION B9 LES UP TO 14: CPT

## 2022-08-23 PROCEDURE — ? PARING HYPERKERATOTIC LESION

## 2022-08-23 PROCEDURE — 11056 PARNG/CUTG B9 HYPRKR LES 2-4: CPT | Mod: 59

## 2022-08-23 PROCEDURE — ? COUNSELING

## 2022-08-23 PROCEDURE — ? LIQUID NITROGEN

## 2022-08-23 ASSESSMENT — LOCATION DETAILED DESCRIPTION DERM: LOCATION DETAILED: RIGHT LATERAL PLANTAR MIDFOOT

## 2022-08-23 ASSESSMENT — LOCATION SIMPLE DESCRIPTION DERM: LOCATION SIMPLE: RIGHT PLANTAR SURFACE

## 2022-08-23 ASSESSMENT — LOCATION ZONE DERM: LOCATION ZONE: FEET

## 2022-08-23 NOTE — PROCEDURE: LIQUID NITROGEN
Medical Necessity Information: It is in your best interest to select a reason for this procedure from the list below. All of these items fulfill various CMS LCD requirements except the new and changing color options.
Consent: The patient's consent was obtained including but not limited to risks of crusting, scabbing, blistering, scarring, darker or lighter pigmentary change, recurrence, incomplete removal and infection.
Render Post Care In The Note?: yes
Post-Care Instructions: I reviewed with the patient in detail post-care instructions. Patient is to wear sunprotection, and avoid picking at any of the treated lesions. Pt may apply Vaseline to crusted or scabbing areas.
Detail Level: Zone
Total Number Of Lesions Treated: 3
Include Z78.9 (Other Specified Conditions Influencing Health Status) As An Associated Diagnosis?: No
Medical Necessity Clause: This procedure was medically necessary because the lesions that were treated were:
Duration Of Freeze Thaw-Cycle (Seconds): 5
Spray Paint Text: The liquid nitrogen was applied to the skin utilizing a spray paint frosting technique.
Number Of Freeze-Thaw Cycles: 3 freeze-thaw cycles

## 2022-10-03 DIAGNOSIS — F41.1 GENERALIZED ANXIETY DISORDER: ICD-10-CM

## 2022-10-03 DIAGNOSIS — F90.2 ADHD (ATTENTION DEFICIT HYPERACTIVITY DISORDER), COMBINED TYPE: ICD-10-CM

## 2022-10-03 RX ORDER — CLONAZEPAM 1 MG/1
1 TABLET ORAL
Qty: 30 TABLET | Refills: 2 | Status: CANCELLED | OUTPATIENT
Start: 2022-10-03 | End: 2022-11-02

## 2022-10-03 RX ORDER — DEXTROAMPHETAMINE SACCHARATE, AMPHETAMINE ASPARTATE, DEXTROAMPHETAMINE SULFATE AND AMPHETAMINE SULFATE 7.5; 7.5; 7.5; 7.5 MG/1; MG/1; MG/1; MG/1
30 TABLET ORAL 2 TIMES DAILY
Qty: 60 TABLET | Refills: 0 | Status: CANCELLED | OUTPATIENT
Start: 2022-10-03 | End: 2022-11-02

## 2022-10-03 NOTE — TELEPHONE ENCOUNTER
Upcoming appt with you 10/04Received request via: Patient    Was the patient seen in the last year in this department? Yes    Does the patient have an active prescription (recently filled or refills available) for medication(s) requested? No

## 2022-10-04 ENCOUNTER — TELEMEDICINE (OUTPATIENT)
Dept: BEHAVIORAL HEALTH | Facility: CLINIC | Age: 32
End: 2022-10-04
Payer: COMMERCIAL

## 2022-10-04 DIAGNOSIS — F90.2 ADHD (ATTENTION DEFICIT HYPERACTIVITY DISORDER), COMBINED TYPE: ICD-10-CM

## 2022-10-04 DIAGNOSIS — F41.1 GENERALIZED ANXIETY DISORDER: ICD-10-CM

## 2022-10-04 PROCEDURE — 99999 PR NO CHARGE: CPT | Performed by: PSYCHIATRY & NEUROLOGY

## 2022-10-04 RX ORDER — DEXTROAMPHETAMINE SACCHARATE, AMPHETAMINE ASPARTATE, DEXTROAMPHETAMINE SULFATE AND AMPHETAMINE SULFATE 7.5; 7.5; 7.5; 7.5 MG/1; MG/1; MG/1; MG/1
30 TABLET ORAL 2 TIMES DAILY
Qty: 60 TABLET | Refills: 0 | Status: SHIPPED | OUTPATIENT
Start: 2022-10-04 | End: 2022-11-03

## 2022-10-04 RX ORDER — CLONAZEPAM 1 MG/1
1 TABLET ORAL
Qty: 30 TABLET | Refills: 2 | Status: SHIPPED | OUTPATIENT
Start: 2022-10-04 | End: 2022-11-03

## 2022-10-04 NOTE — PROGRESS NOTES
Provider contacted patient via phone to assess for technical difficulties with televisit.  Patient reports that he was informed this morning that his visit bill would be over $300 due to a lapse in insurance.  Stating unable to afford to care, but still taking Adderall 30 mg twice daily and clonazepam 1 mg as needed several times a week.  Reports running out of medications and concerned about relapse of symptoms and functioning without them.  Denies adverse side effects to medications and using prescription meds as prescribed.  At this time, discontinuation of all stimulants and chronic benzodiazepines could lead to substantial decompensation and thus affect employment and other psychosocial stressors    I agreed to give a 1 time refill of both Adderall and clonazepam, and recommended patient reschedule appointment for when insurance coverage restarts.  Informed patient will not be able to refill medications past this month without a visit to establish care with new provider, preferably myself